# Patient Record
(demographics unavailable — no encounter records)

---

## 2025-02-04 NOTE — HISTORY OF PRESENT ILLNESS
[de-identified] : 52-year-old lady.  Annual breast examination.  Breast cancer risk score = 73.3.  CC: Today she is asymptomatic.   2011: Diagnosed with lobular carcinoma in situ (LCIS) on stereotactic biopsy of suspicious microcalcifications of her LEFT BREAST, in   No additional worrisome findings on subsequent excision.  Right breast:  Sono-core needle biopsy @[facility] in December 2014: benign.  1/25/2025: Right breast sonogram guided core needle biopsy at [facility] was benign. 6-month follow-up right breast ultrasound recommended for July 2025. Prescription provided today.   Saw Dr. Tracy Ruth from medical oncology but DECLINED systemic risk reduction treatment.  She's been followed carefully clinically and is doing well.   No personal history of malignancy.   +FH: Mother: Breast cancer at age 80. Her mother's genetic testing in 2020 identified no deleterious mutations.  Two maternal aunts had breast cancer. Her maternal grandmother also had carcinoma of the breast.  No relatives with ovarian cancer.  Not Ashkenazi.  + Additional family history of malignancy: 2021 her mother had squamous cell carcinoma of the skin of her left leg.   Her menarche was at age 11.  Nulliparous. Surgical menopause at 49 ([facility]RIA/BSO) for enlarging fibroids and associated pain.   Breast cancer risk score is 73.3 (calculated at 01-20-19)***   PMD: NOW Dr Mary Carmen EATON Her internist was Dr. Veronica Durbin, then Dr Lynette Nair.  + ALLERGIC: Penicillin.  No pacemaker or defibrillator. No anticoagulants.  2023: Diagnosed with hypertension. Treated with amlodipine. Cardiology: Dr. Pepe CLARK.   GYN: Dr. Lexie PALACIOS. March 2024 visit was unremarkable. She used to see Dr. Margarita Middleton.  August 2021: [facility]/BSO, Dr. Leo BEDOLLA. Preop: Enlarging fibroids with associated pain. No evidence of malignancy   GI: Dr. Berna DORSEY. July 2022 colonoscopy okay x7 years

## 2025-02-04 NOTE — ASSESSMENT
[FreeTextEntry1] : 51-year-old lady being seen for annual breast examination.  + Personal history of LCIS left breast.  + Family history of malignancy.  Breast cancer risk score = 73.3.   9/20/2024: Bilateral breast : BI-RADS 2.  1/20/2025: Annual bilateral mammogram and sonogram at [facility]: BI-RADS 0.  Subsequent targeted right breast ultrasound demonstrated nodule for which tissue sampling was recommended.  1/25/2025: Right breast sonogram guided core needle biopsy at [facility]: Benign and concordant. Prescription provided today for July 2025 right breast ultrasound.  Clinically doing well.  If no problems, have offered to continue to see her annually, sooner if needed.  Reviewed in detail, all questions answered.

## 2025-02-04 NOTE — PHYSICAL EXAM
[Normal] : supple, no neck mass and thyroid not enlarged [Normal Neck Lymph Nodes] : normal neck lymph nodes  [Normal Supraclavicular Lymph Nodes] : normal supraclavicular lymph nodes [Normal Axillary Lymph Nodes] : normal axillary lymph nodes [Normal] : normal appearance, no rash, nodules, vesicles, ulcers, erythema [de-identified] : Groins not examined [de-identified] : below

## 2025-02-04 NOTE — REVIEW OF SYSTEMS
[Negative] : Endocrine [FreeTextEntry5] : HTN [FreeTextEntry7] : PCN [FreeTextEntry1] : Increased risk of breast cancer.

## 2025-02-04 NOTE — PHYSICAL EXAM
[Normal] : supple, no neck mass and thyroid not enlarged [Normal Neck Lymph Nodes] : normal neck lymph nodes  [Normal Supraclavicular Lymph Nodes] : normal supraclavicular lymph nodes [Normal Axillary Lymph Nodes] : normal axillary lymph nodes [Normal] : normal appearance, no rash, nodules, vesicles, ulcers, erythema [de-identified] : Groins not examined [de-identified] : below

## 2025-02-04 NOTE — PHYSICAL EXAM
[Normal] : supple, no neck mass and thyroid not enlarged [Normal Neck Lymph Nodes] : normal neck lymph nodes  [Normal Supraclavicular Lymph Nodes] : normal supraclavicular lymph nodes [Normal Axillary Lymph Nodes] : normal axillary lymph nodes [Normal] : normal appearance, no rash, nodules, vesicles, ulcers, erythema [de-identified] : Groins not examined [de-identified] : below

## 2025-02-04 NOTE — HISTORY OF PRESENT ILLNESS
[de-identified] : 52-year-old lady.  Annual breast examination.  Breast cancer risk score = 73.3.  CC: Today she is asymptomatic.   2011: Diagnosed with lobular carcinoma in situ (LCIS) on stereotactic biopsy of suspicious microcalcifications of her LEFT BREAST, in   No additional worrisome findings on subsequent excision.  Right breast:  Sono-core needle biopsy @[facility] in December 2014: benign.  1/25/2025: Right breast sonogram guided core needle biopsy at [facility] was benign. 6-month follow-up right breast ultrasound recommended for July 2025. Prescription provided today.   Saw Dr. Tracy Ruth from medical oncology but DECLINED systemic risk reduction treatment.  She's been followed carefully clinically and is doing well.   No personal history of malignancy.   +FH: Mother: Breast cancer at age 80. Her mother's genetic testing in 2020 identified no deleterious mutations.  Two maternal aunts had breast cancer. Her maternal grandmother also had carcinoma of the breast.  No relatives with ovarian cancer.  Not Ashkenazi.  + Additional family history of malignancy: 2021 her mother had squamous cell carcinoma of the skin of her left leg.   Her menarche was at age 11.  Nulliparous. Surgical menopause at 49 ([facility]RIA/BSO) for enlarging fibroids and associated pain.   Breast cancer risk score is 73.3 (calculated at 01-20-19)***   PMD: NOW Dr Mary Carmen EATON Her internist was Dr. Veronica Durbin, then Dr Lynette Nair.  + ALLERGIC: Penicillin.  No pacemaker or defibrillator. No anticoagulants.  2023: Diagnosed with hypertension. Treated with amlodipine. Cardiology: Dr. Pepe CLARK.   GYN: Dr. Lexie PALACIOS. March 2024 visit was unremarkable. She used to see Dr. Margarita Middleton.  August 2021: [facility]/BSO, Dr. Leo BEDOLLA. Preop: Enlarging fibroids with associated pain. No evidence of malignancy   GI: Dr. Berna DORSEY. July 2022 colonoscopy okay x7 years

## 2025-02-04 NOTE — HISTORY OF PRESENT ILLNESS
[de-identified] : 52-year-old lady.  Annual breast examination.  Breast cancer risk score = 73.3.  CC: Today she is asymptomatic.   2011: Diagnosed with lobular carcinoma in situ (LCIS) on stereotactic biopsy of suspicious microcalcifications of her LEFT BREAST, in   No additional worrisome findings on subsequent excision.  Right breast:  Sono-core needle biopsy @[facility] in December 2014: benign.  1/25/2025: Right breast sonogram guided core needle biopsy at [facility] was benign. 6-month follow-up right breast ultrasound recommended for July 2025. Prescription provided today.   Saw Dr. Tracy Ruth from medical oncology but DECLINED systemic risk reduction treatment.  She's been followed carefully clinically and is doing well.   No personal history of malignancy.   +FH: Mother: Breast cancer at age 80. Her mother's genetic testing in 2020 identified no deleterious mutations.  Two maternal aunts had breast cancer. Her maternal grandmother also had carcinoma of the breast.  No relatives with ovarian cancer.  Not Ashkenazi.  + Additional family history of malignancy: 2021 her mother had squamous cell carcinoma of the skin of her left leg.   Her menarche was at age 11.  Nulliparous. Surgical menopause at 49 ([facility]RIA/BSO) for enlarging fibroids and associated pain.   Breast cancer risk score is 73.3 (calculated at 01-20-19)***   PMD: NOW Dr Mary Carmen EATON Her internist was Dr. Veronica Durbin, then Dr Lynette Nair.  + ALLERGIC: Penicillin.  No pacemaker or defibrillator. No anticoagulants.  2023: Diagnosed with hypertension. Treated with amlodipine. Cardiology: Dr. Pepe CLARK.   GYN: Dr. Lexie PALACIOS. March 2024 visit was unremarkable. She used to see Dr. Margarita Middleton.  August 2021: [facility]/BSO, Dr. Leo BEDOLLA. Preop: Enlarging fibroids with associated pain. No evidence of malignancy   GI: Dr. Berna DORSEY. July 2022 colonoscopy okay x7 years

## 2025-02-04 NOTE — REASON FOR VISIT
[Follow-Up Visit] : a follow-up visit for [Other: _____] : [unfilled] [FreeTextEntry2] : Left breast LCIS excised in 2011, breast cancer risk score = 73.3.

## 2025-04-25 NOTE — HISTORY OF PRESENT ILLNESS
[FreeTextEntry1] : BP check. No new complaints. Lost weight with diet and exercise No CP, SOB. BP at home variable from normal to mildly elevated.

## 2025-06-13 NOTE — HEALTH RISK ASSESSMENT
[No] : No [0] : 2) Feeling down, depressed, or hopeless: Not at all (0) [PHQ-2 Negative - No further assessment needed] : PHQ-2 Negative - No further assessment needed [Time Spent: ___ Minutes] : I spent [unfilled] minutes performing a depression screening for this patient. [Hepatitis C test offered] : Hepatitis C test offered [Alone] : lives alone [Employed] : employed [Fully functional (bathing, dressing, toileting, transferring, walking, feeding)] : Fully functional (bathing, dressing, toileting, transferring, walking, feeding) [Fully functional (using the telephone, shopping, preparing meals, housekeeping, doing laundry, using] : Fully functional and needs no help or supervision to perform IADLs (using the telephone, shopping, preparing meals, housekeeping, doing laundry, using transportation, managing medications and managing finances) [Never] : Never [NO] : No [[id number]] : 0 [MammogramDate] : 1/25 [PapSmearDate] : 2024 [PapSmearComments] : s/p hysterectomy [ColonoscopyDate] : 2022 [de-identified] :

## 2025-06-13 NOTE — PHYSICAL EXAM
[No Acute Distress] : no acute distress [Well Nourished] : well nourished [Well Developed] : well developed [Well-Appearing] : well-appearing [Normal Sclera/Conjunctiva] : normal sclera/conjunctiva [PERRL] : pupils equal round and reactive to light [EOMI] : extraocular movements intact [Normal Outer Ear/Nose] : the outer ears and nose were normal in appearance [Normal Oropharynx] : the oropharynx was normal [No JVD] : no jugular venous distention [No Lymphadenopathy] : no lymphadenopathy [Supple] : supple [Thyroid Normal, No Nodules] : the thyroid was normal and there were no nodules present [No Respiratory Distress] : no respiratory distress  [No Accessory Muscle Use] : no accessory muscle use [Clear to Auscultation] : lungs were clear to auscultation bilaterally [Normal Rate] : normal rate  [Regular Rhythm] : with a regular rhythm [Normal S1, S2] : normal S1 and S2 [No Murmur] : no murmur heard [No Carotid Bruits] : no carotid bruits [No Abdominal Bruit] : a ~M bruit was not heard ~T in the abdomen [No Varicosities] : no varicosities [Pedal Pulses Present] : the pedal pulses are present [No Edema] : there was no peripheral edema [No Palpable Aorta] : no palpable aorta [No Extremity Clubbing/Cyanosis] : no extremity clubbing/cyanosis [Normal Appearance] : normal in appearance [No Nipple Discharge] : no nipple discharge [No Axillary Lymphadenopathy] : no axillary lymphadenopathy [Soft] : abdomen soft [Non Tender] : non-tender [Non-distended] : non-distended [No Masses] : no abdominal mass palpated [No HSM] : no HSM [Normal Bowel Sounds] : normal bowel sounds [Normal Posterior Cervical Nodes] : no posterior cervical lymphadenopathy [Normal Anterior Cervical Nodes] : no anterior cervical lymphadenopathy [No CVA Tenderness] : no CVA  tenderness [No Spinal Tenderness] : no spinal tenderness [No Joint Swelling] : no joint swelling [Grossly Normal Strength/Tone] : grossly normal strength/tone [No Rash] : no rash [Coordination Grossly Intact] : coordination grossly intact [No Focal Deficits] : no focal deficits [Normal Gait] : normal gait [Normal Affect] : the affect was normal [Normal Insight/Judgement] : insight and judgment were intact [de-identified] : left scar well healed [de-identified] : obese

## 2025-06-13 NOTE — ADDENDUM
[FreeTextEntry1] : This note was partly authored by Kimberly Saldivar working as a scribe for TEODORO Bowen.   I, TEODORO Bowen, have reviewed this note and confirm it is true and accurate. I personally performed the history and physical examination and made all the decisions. 06/13/2025

## 2025-06-13 NOTE — ASSESSMENT
[Vaccines Reviewed] : Immunizations reviewed today. Please see immunization details in the vaccine log within the immunization flowsheet.  [FreeTextEntry1] : Diet and exercise Check complete blood work Pap mammogram breast self-exam Bone density colonoscopy ophthalmology vitamin D Depression screen done and reviewed 5 minutes Up-to-date on vaccines Hyperlipidemia check lipids and advise Hypertension patient has whitecoat hypertension blood pressure better at home continue amlodipine 10 mg daily follow-up cardiology Left breast cancer follow-up oncology Obesity strict diet exercise weight loss can consider meds Follow-up 4 months

## 2025-06-13 NOTE — HISTORY OF PRESENT ILLNESS
[de-identified] : 52-year-old black female presents for complete checkup as a new patient patient denies chest pain shortness of breath headache fever chills abdominal pain she admits to whitecoat hypertension she has been seen by cardiology recently for evaluation overall she feels well